# Patient Record
Sex: FEMALE | Race: OTHER | HISPANIC OR LATINO
[De-identification: names, ages, dates, MRNs, and addresses within clinical notes are randomized per-mention and may not be internally consistent; named-entity substitution may affect disease eponyms.]

---

## 2021-03-04 PROBLEM — Z00.00 ENCOUNTER FOR PREVENTIVE HEALTH EXAMINATION: Status: ACTIVE | Noted: 2021-03-04

## 2021-03-11 ENCOUNTER — APPOINTMENT (OUTPATIENT)
Dept: SURGERY | Facility: CLINIC | Age: 36
End: 2021-03-11
Payer: COMMERCIAL

## 2021-03-11 VITALS
BODY MASS INDEX: 25.4 KG/M2 | HEIGHT: 62 IN | DIASTOLIC BLOOD PRESSURE: 83 MMHG | OXYGEN SATURATION: 99 % | TEMPERATURE: 98 F | WEIGHT: 138 LBS | HEART RATE: 68 BPM | SYSTOLIC BLOOD PRESSURE: 125 MMHG

## 2021-03-11 DIAGNOSIS — Z78.9 OTHER SPECIFIED HEALTH STATUS: ICD-10-CM

## 2021-03-11 DIAGNOSIS — G40.909 EPILEPSY, UNSPECIFIED, NOT INTRACTABLE, W/OUT STATUS EPILEPTICUS: ICD-10-CM

## 2021-03-11 DIAGNOSIS — N63.10 UNSPECIFIED LUMP IN THE RIGHT BREAST, UNSPECIFIED QUADRANT: ICD-10-CM

## 2021-03-11 PROCEDURE — 99243 OFF/OP CNSLTJ NEW/EST LOW 30: CPT

## 2021-03-11 PROCEDURE — 99072 ADDL SUPL MATRL&STAF TM PHE: CPT

## 2021-03-11 PROCEDURE — 99203 OFFICE O/P NEW LOW 30 MIN: CPT

## 2021-03-11 RX ORDER — LEVETIRACETAM 1000 MG/1
TABLET, FILM COATED ORAL
Refills: 0 | Status: ACTIVE | COMMUNITY

## 2021-03-11 RX ORDER — ASCORBIC ACID 500 MG
TABLET ORAL
Refills: 0 | Status: ACTIVE | COMMUNITY

## 2021-03-11 RX ORDER — CLOBAZAM 5 MG/1
5 TABLET ORAL
Refills: 0 | Status: ACTIVE | COMMUNITY

## 2021-03-17 NOTE — DATA REVIEWED
[FreeTextEntry1] : Exam requested by:\par JENNI BHAT\par [address]\par [facility] [id number]\par SITE PERFORMED: MACRINA\par SITE PHONE: [phone]\par Patient: DESIRE GONZALEZ\par YOB: 1985\par Phone: [phone]\par MRN: [id number] Acc: [id number]\par Date of Exam: 02-\par  \par EXAM: DIGITAL BILATERAL DIAGNOSTIC MAMMOGRAM AND TOMOSYNTHESIS AND BREAST ULTRASOUND\par \par HISTORY: The patient is 35 years old and is seen for left breast palpable mass. Patient reports: Pain and lump in left breast x4 days.\par \par CLINICAL BREAST EXAMINATION: The patient reports that her last clinical breast exam was within the past year. \par \par COMPARISON: Baseline examination.\par \par MAMMOGRAM:\par \par TECHNIQUE: Full-field digital mammography of bilateral breasts was obtained. Additional imaging is composed of CC, MLO, true lateral, spot compression. Low-dose full-field digital breast tomosynthesis examination was performed with tomosynthesis acquisitions and synthesized 2D reconstructed mammogram. Computer-aided detection (CAD) was utilized.  \par \par FINDINGS:\par BREAST COMPOSITION: The breasts are heterogeneously dense, which may obscure small masses.\par \par Benign-appearing microcalcification(s) and/or macrocalcification(s). Lizemores-shaped mole marker(s). Any triangle-shaped marker placed on the skin by the technologist indicates the location of a patient reported area of focal concern. No suspicious left breast findings.\par \par Right breast anterior/middle depth 3 o'clock to 4 o'clock, subcentimeter grouped microcalcifications, round and amorphous, cannot be definitively confirmed as milk of calcium based on spot magnification appearance, indeterminate, no definite ultrasound correlate.\par \par BREAST ULTRASOUND:  \par \par TECHNIQUE: Complete bilateral breast ultrasound, with evaluation of the four quadrants, retroareolar regions and axillae, was performed. \par \par FINDINGS: \par \par \par Right 2 o'clock 2 cm from nipple oblique orientation questionable lobulated mass versus complicated cyst, 0.9 x 0.4 x 0.6 cm, probably benign. Right 3 o'clock 1 cm from nipple oval duct ectasia, maximal length 1.9 cm, maximal width 0.3 cm, probably benign, no intraductal lesion evident. Benign right axilla. Left 12 o'clock 7 cm from nipple area of concern, no findings. Left 9 o'clock 4 cm from nipple mass, 0.7 cm, probably benign. Benign left axilla.\par \par IMPRESSION: \par \par 1.  Indeterminate right breast mammographic calcifications. Stereotactic biopsy right breast x1 with postbiopsy clip placement recommended. If clinically appropriate, medications with a blood thinning effect should be stopped for 7 days prior to biopsy.\par \par 2.  Few probably benign ultrasound findings bilaterally. Follow-up targeted bilateral breast ultrasound in 6 months is recommended.\par \par 3.  No ultrasound or mammographic findings to account for left breast painful palpable lump. Further evaluation could be considered if clinically indicated.\par \par \par FOLLOW-UP: Stereotactic biopsy. \par \par Immediate follow-up recommendation was discussed with the patient before the patient left this facility. The patient is scheduled to return to a [facility] facility for the recommended follow-up on 3/4/2021.\par \par ASSESSMENT: BI-RADS Category 4:  Suspicious. \par \par This examination should not preclude the clinical evaluation of a suspicious palpable abnormality.\par \par As per the FDA requirements, a layman's letter has been generated and sent to your patient stating the results and recommendations of this breast imaging study. We have entered your patient into our reminder system and will notify them when they are due for their next breast imaging exam. \par \par Thank you for the opportunity to participate in the care of this patient.  \par  \par Alhaji Hunt MD  - Electronically Signed: 02- 2:33 PM \par Physician to Physician Direct Line is: [phone]\par

## 2021-03-17 NOTE — PLAN
[FreeTextEntry1] : Ms. GONZALEZ  is presenting  today for an evaluation .  she is doing well and offers no complaints.  Results of  her recent  imaging and physical examination findings were discussed in details.   She  was advised to have                  Right    breast stereotactic biopsy  and return after the tests. Importance of monthly self-breast examination was reinforced.  Patient's questions and concerns addressed to patient's satisfaction.\par \par

## 2021-03-17 NOTE — CONSULT LETTER
[Dear  ___] : Dear  [unfilled], [Consult Letter:] : I had the pleasure of evaluating your patient, [unfilled]. [Consult Closing:] : Thank you very much for allowing me to participate in the care of this patient.  If you have any questions, please do not hesitate to contact me. [Sincerely,] : Sincerely, [Please see my note below.] : Please see my note below. [FreeTextEntry3] : Haider Llamas MD, FACS

## 2021-03-17 NOTE — PHYSICAL EXAM
[Normal Breath Sounds] : Normal breath sounds [Normal Heart Sounds] : normal heart sounds [No Rash or Lesion] : No rash or lesion [Alert] : alert [Oriented to Person] : oriented to person [Oriented to Place] : oriented to place [Oriented to Time] : oriented to time [Calm] : calm [de-identified] : The patient is alert, well-groomed, well developed and cheerful.  [de-identified] : Head is normocephalic. Conjunctiva pink, anicteric. Nasal mucosa pink, septum midline. Oral mucosa pink. Tongue midline, pharynx without exudates. \par \par   [de-identified] : Neck supple. Trachea midline. Thyroid isthmus barely palpable, lobes not felt.\par   [de-identified] : Breath sounds equal and bilateral, no wheezing no rales or rhonchi  [de-identified] : No chest deformity. Breast are symmetric, Normal contours. No nodules, masses, tenderness, or axillary or supraclavicular  adenopathy. No nipple discharge. no skin retraction \par   [de-identified] : \par   [de-identified] : WNL

## 2021-03-17 NOTE — HISTORY OF PRESENT ILLNESS
[de-identified] : DESIRE GONZALEZ is a 35 year old female who present in the office for initial consultation with chief complaint of having a Right  breast mass for  few  months. Patient was  referred by Dr. Francis Hahn. Patient denies any trauma and she has no nipple discharge. Patient denies any fever, night sweats or loss of appetite.  Patient stated that she was experiencing pain at the right breast that went away.   There is no family history of breast carcinoma.   Menarche at age 11, - 0 , para -0, and her last menstrual period was in 2021 Patient is on no hormonal replacement therapy     Patient had bilateral diagnostic breast Mammogram and Ultrasound done on 2021 that showed. Right breast anterior/middle depth 3 o'clock to 4 o'clock, subcentimeter grouped microcalcifications and deemed  BI-RADS Category 4

## 2021-06-10 ENCOUNTER — RESULT REVIEW (OUTPATIENT)
Age: 36
End: 2021-06-10

## 2022-07-25 ENCOUNTER — EMERGENCY (EMERGENCY)
Facility: HOSPITAL | Age: 37
LOS: 1 days | Discharge: HOME | End: 2022-07-25
Attending: EMERGENCY MEDICINE | Admitting: EMERGENCY MEDICINE

## 2022-07-25 VITALS
DIASTOLIC BLOOD PRESSURE: 69 MMHG | SYSTOLIC BLOOD PRESSURE: 139 MMHG | TEMPERATURE: 100 F | HEART RATE: 105 BPM | OXYGEN SATURATION: 100 % | RESPIRATION RATE: 18 BRPM

## 2022-07-25 VITALS
TEMPERATURE: 101 F | HEART RATE: 94 BPM | OXYGEN SATURATION: 100 % | DIASTOLIC BLOOD PRESSURE: 77 MMHG | RESPIRATION RATE: 16 BRPM | SYSTOLIC BLOOD PRESSURE: 127 MMHG

## 2022-07-25 LAB

## 2022-07-25 PROCEDURE — 99283 EMERGENCY DEPT VISIT LOW MDM: CPT

## 2022-07-25 RX ORDER — ACETAMINOPHEN 500 MG
650 TABLET ORAL ONCE
Refills: 0 | Status: COMPLETED | OUTPATIENT
Start: 2022-07-25 | End: 2022-07-25

## 2022-07-25 RX ORDER — OXYMETAZOLINE HYDROCHLORIDE 0.5 MG/ML
2 SPRAY NASAL
Qty: 12 | Refills: 1
Start: 2022-07-25 | End: 2022-07-30

## 2022-07-25 RX ORDER — ALBUTEROL 90 UG/1
2 AEROSOL, METERED ORAL
Qty: 56 | Refills: 0
Start: 2022-07-25 | End: 2022-07-31

## 2022-07-25 RX ORDER — IBUPROFEN 200 MG
30 TABLET ORAL
Qty: 630 | Refills: 0
Start: 2022-07-25 | End: 2022-07-31

## 2022-07-25 RX ORDER — DEXAMETHASONE 0.5 MG/5ML
10 ELIXIR ORAL ONCE
Refills: 0 | Status: COMPLETED | OUTPATIENT
Start: 2022-07-25 | End: 2022-07-25

## 2022-07-25 RX ADMIN — Medication 650 MILLIGRAM(S): at 11:05

## 2022-07-25 RX ADMIN — Medication 10 MILLIGRAM(S): at 11:05

## 2022-07-25 NOTE — ED ADULT TRIAGE NOTE - CHIEF COMPLAINT QUOTE
pt c/o cough, left earache, and loss of voice x 2 days. + fever/chills. arrived from [place] last week.

## 2022-07-25 NOTE — ED PROVIDER NOTE - NSFOLLOWUPINSTRUCTIONS_ED_ALL_ED_FT
Upper Respiratory Infection, Adult    An upper respiratory infection (URI) affects the nose, throat, and upper air passages. URIs are caused by germs (viruses). The most common type of URI is often called "the common cold."    Medicines cannot cure URIs, but you can do things at home to relieve your symptoms. URIs usually get better within 7–10 days.    Follow these instructions at home:  Activity    Rest as needed.  If you have a fever, stay home from work or school until your fever is gone, or until your doctor says you may return to work or school.  You should stay home until you cannot spread the infection anymore (you are not contagious).  Your doctor may have you wear a face mask so you have less risk of spreading the infection.    Relieving symptoms    Gargle with a salt-water mixture 3–4 times a day or as needed. To make a salt-water mixture, completely dissolve ½–1 tsp of salt in 1 cup of warm water.  Use a cool-mist humidifier to add moisture to the air. This can help you breathe more easily.    Eating and drinking    Drink enough fluid to keep your pee (urine) pale yellow.  Eat soups and other clear broths.     General instructions    Take over-the-counter and prescription medicines only as told by your doctor. These include cold medicines, fever reducers, and cough suppressants.  Do not use any products that contain nicotine or tobacco. These include cigarettes and e-cigarettes. If you need help quitting, ask your doctor.  Avoid being where people are smoking (avoid secondhand smoke).  Make sure you get regular shots and get the flu shot every year.  Keep all follow-up visits as told by your doctor. This is important.     How to avoid spreading infection to others    Wash your hands often with soap and water. If you do not have soap and water, use hand .  Avoid touching your mouth, face, eyes, or nose.  Cough or sneeze into a tissue or your sleeve or elbow. Do not cough or sneeze into your hand or into the air.     Contact a doctor if:  You are getting worse, not better.  You have any of these:  A fever.  Chills.  Brown or red mucus in your nose.  Yellow or brown fluid (discharge)coming from your nose.  Pain in your face, especially when you bend forward.  Swollen neck glands.  Pain with swallowing.  White areas in the back of your throat.    Get help right away if:  You have shortness of breath that gets worse.  You have very bad or constant:  Headache.  Ear pain.  Pain in your forehead, behind your eyes, and over your cheekbones (sinus pain).  Chest pain.  You have long-lasting (chronic) lung disease along with any of these:  Wheezing.  Long-lasting cough.  Coughing up blood.  A change in your usual mucus.  You have a stiff neck.  You have changes in your:  Vision.  Hearing.  Thinking.  Mood.    Summary  An upper respiratory infection (URI) is caused by a germ called a virus. The most common type of URI is often called "the common cold."  URIs usually get better within 7–10 days.  Take over-the-counter and prescription medicines only as told by your doctor.    ADDITIONAL NOTES AND INSTRUCTIONS    Please follow up with your Primary MD in 24-48 hr.  Seek immediate medical care for any new/worsening signs or symptoms.     Infección de las vías respiratorias superiores, en adultos    Gabrielle infección de las vías respiratorias superiores (URI) afecta la nariz, la garganta y las vías respiratorias superiores. Los URI son causados ??por gérmenes (virus). El tipo más común de URI a menudo se denomina "resfriado común".    Los medicamentos no pueden curar las infecciones respiratorias superiores, jordyn puede hacer cosas en casa para aliviar kelli síntomas. Las URI generalmente mejoran en 7 a 10 días.    Siga estas instrucciones en casa:  Actividad    Descanse según sea necesario.  Si tiene fiebre, quédese en casa y no vaya al trabajo ni a la escuela hasta que le desaparezca la fiebre o hasta que sarkar médico le diga que puede regresar al trabajo o la escuela.  Debe quedarse en casa hasta que ya no pueda propagar la infección (ya no es contagioso).  Es posible que sarkar médico le pida que use gabrielle mascarilla para que tenga menos riesgo de propagar la infección.    Aliviar los síntomas    Onelia gárgaras con gabrielle mezcla de agua salada de 3 a 4 veces al día o según sea necesario. Para hacer gabrielle mezcla de agua salada, disuelva completamente entre ½ y 1 cucharadita de sal en 1 taza de agua tibia.  Use un humidificador de vapor frío para agregar humedad al aire. Tekoa puede ayudarlo a respirar más fácilmente.    Comiendo y bebiendo    Sary suficiente líquido para mantener sarkar pipí (orina) de color amarillo pálido.  Come sopas y otros caldos mami.    Instrucciones generales    Paulden medicamentos de venta florian y recetados solo según las indicaciones de sarkar médico. Estos incluyen medicamentos para el resfriado, antifebriles y antitusígenos.  No use ningún producto que contenga nicotina o tabaco. Estos incluyen cigarrillos y cigarrillos electrónicos. Si necesita ayuda para dejar de fumar, consulte con sarkar médico.  Evite estar donde la gente fuma (evite el humo de segunda mano).  Asegúrese de recibir vacunas regulares y vacunarse contra la gripe todos los años.  Asista a todas las visitas de seguimiento que le indique sarkar médico. Tekoa es importante.    Cómo evitar transmitir la infección a otras personas    Lávese las meghana frecuentemente con agua y jabón. Si no tiene agua y jabón, use desinfectante para meghana.  Evite tocarse la boca, la raiza, los ojos o la nariz.  Tosa o estornude en un pañuelo de papel, en la manga o en el codo. No tosa ni estornude en sarkar mano o en el aire.    Comuníquese con un médico si:  Estás empeorando, no mejorando.  Tienes alguno de estos:  Fiebre  Escalofríos.  Moco marrón o nguyen en la nariz.  Fluido (secreción) amarillo o marrón que sale de la nariz.  Dolor en la raiza, especialmente cuando se inclina hacia adelante.  Glándulas del abraham hinchadas.  Dolor al tragar.  Áreas karime en la parte posterior de la garganta.    Obtenga ayuda de inmediato si:  Tiene dificultad para respirar que empeora.  Tienes muy andrea o satya:  Dolor de ericka.  Dolor de oído.  Dolor en la frente, detrás de los ojos y sobre los pómulos (dolor de los senos nasales).  Dolor en el pecho.  Tiene gabrielle enfermedad pulmonar prolongada (crónica) junto con cualquiera de los siguientes:  Sibilancias.  Tos de larga duración.  Tosiendo kelly.  Un cambio en sarkar moco habitual.  Tienes rigidez en el abraham.  Tiene cambios en sarkar:  Visión.  Escuchando.  Pensando.  Estado animico.    Resumen  Gabrielle infección de las vías respiratorias superiores (URI) es causada por un germen llamado virus. El tipo más común de URI a menudo se denomina "resfriado común".  Las URI generalmente mejoran en 7 a 10 días.  Paulden medicamentos de venta florian y recetados solo según las indicaciones de sarkar médico.    NOTAS E INSTRUCCIONES ADICIONALES    Onelia un seguimiento con sarkar médico de cabecera en 24-48 horas.  Busque atención médica inmediata ante cualquier signo o síntoma nuevo o que empeore.

## 2022-07-25 NOTE — ED PROVIDER NOTE - ATTENDING APP SHARED VISIT CONTRIBUTION OF CARE
The patient is a 36y Female who has a past medical and surgery history of  PTED with pt c/o cough, left earache, fever, chills. and loss of voice over the past  2 days after arriving from [place] last week.   Vital Signs Last 24 Hrs  T(F): 99.8 HR: 105 BP: 139/69 RR: 18 SpO2: 100% (25 Jul 2022 09:37) (100% - 100%)  PE: as described; my additions and exceptions are noted in the chart    DATA:  EKG: No old EKG  LAB: Pending at time of evaluation    IMPRESSION/RISK:  Dx=covid;     Consideration include Other viruses can cause but this is most likely; vitals clinical presentation all signal that extensive workup is not necessary  Plan  reassurance  swab  d/c with followup  RTED PRN

## 2022-07-25 NOTE — ED PROVIDER NOTE - CLINICAL SUMMARY MEDICAL DECISION MAKING FREE TEXT BOX
37 yo female with URI symptoms. no trismus, no PTA, no drooling. low suspicion for any airway compromises.   pt is found to be febrile. will do symptomatic treatment and reassess.

## 2022-07-25 NOTE — ED PROVIDER NOTE - NS ED ATTENDING STATEMENT MOD
This was a shared visit with the ОЛЕГ. I reviewed and verified the documentation and independently performed the documented:

## 2022-07-25 NOTE — ED PROVIDER NOTE - PROGRESS NOTE DETAILS
Pt reassessed at bedside, feels well, pain controlled. Informed of workup in ED, reviewed lab and/or radiology results (when applicable) with patient/caregiver. Informed pt of plan for discharge with instructions to follow up with PMD. Pt/caregiver expressed understanding of plan and agrees with plan for discharge. Strict return precautions discussed with patient in layman's terms, patient demonstrated understanding of return precautions. Trinidad Dillon PA-C

## 2022-07-25 NOTE — ED PROVIDER NOTE - OBJECTIVE STATEMENT
37 yo female with pmhx of epilepsy presents to the ED for evaluation of cough, sore throat x2 months. she states her symptoms been intermittent for the last 2 months with no change in the severity. she states for the last 2 days she has noted change in her voice. she states she has been able to eat with no difficulty. she denies drooling, trismus, neck pain.   pt endorses she recently returned from [place].

## 2022-07-25 NOTE — ED PROVIDER NOTE - PATIENT PORTAL LINK FT
You can access the FollowMyHealth Patient Portal offered by [facility] by registering at the following website: [email or web address]. By joining [facility]’s FollowMyHealth portal, you will also be able to view your health information using other applications (apps) compatible with our system.

## 2022-07-27 NOTE — ED POST DISCHARGE NOTE - REASON FOR FOLLOW-UP
Other COVID-19 : detected. COVID 19 positive. Patient advised to self quarantine x 5 days and for others who live in the house to self monitor symptoms and keep 6 feet distance. Strict ED return precautions discussed. Patient understands and agrees.
